# Patient Record
Sex: FEMALE | Race: WHITE | NOT HISPANIC OR LATINO | Employment: OTHER | URBAN - METROPOLITAN AREA
[De-identification: names, ages, dates, MRNs, and addresses within clinical notes are randomized per-mention and may not be internally consistent; named-entity substitution may affect disease eponyms.]

---

## 2017-03-18 ENCOUNTER — HOSPITAL ENCOUNTER (OUTPATIENT)
Dept: RADIOLOGY | Facility: HOSPITAL | Age: 82
Discharge: HOME | End: 2017-03-18
Payer: MEDICARE

## 2017-03-18 DIAGNOSIS — Z12.31 ENCOUNTER FOR SCREENING MAMMOGRAM FOR MALIGNANT NEOPLASM OF BREAST: ICD-10-CM

## 2017-03-18 PROCEDURE — 77063 BREAST TOMOSYNTHESIS BI: CPT | Mod: 26,,, | Performed by: RADIOLOGY

## 2017-03-18 PROCEDURE — 77067 SCR MAMMO BI INCL CAD: CPT | Mod: 26,,, | Performed by: RADIOLOGY

## 2017-03-18 PROCEDURE — 77067 SCR MAMMO BI INCL CAD: CPT | Mod: TC

## 2018-06-25 NOTE — PROGRESS NOTES
"Name: Agnieszka Hodgson  MRN: [id number]   CSN: [id number]      Date: 18      Referring physician:  Aaareferral Self  No address on file    Subjective:      Chief Complaint:  Memory    History of Present Illness (HPI):    Agnieszka Hodgson is a 86 y.o.  female who presents today in the [facility] for evaluation of memory. She is accompanied her daughter (Heber) and  (Edwardo). Heber made the appt and made it very clear that the terms Alzheimer's and dementia should not be used in front of her as she will become extremely anxious. Mrs. Hodgson's daughter-in-law  of Alzheimer's disease. She told her granddaughter she just cannot have this.     For the past 18 months, they have noticed that she has problems with word find. She has a long hx of anxiety. Everything was exacerbated when she moved to [place] from [place] on  to be closer to their son. In addition, she has Macular Degeneration and can't see (much). The move has made this more difficult as well as she is not familiar with her surrounding and has trouble finding things since her vision is impaired.  They know that part of her confusion is due to her vision. However, when dtr tells her something is on the right or left, she does not always seem to understand.     She has more trouble with word find at the end of the day. She does pretty well in the mornings.     They feel the anxeity and fear also exacerbates her issues. She has been on clonazepam for 12+ years. She currently takes 0.5 mg - 1/2 tab BID. If she is even an hour late, she will feel shaky and nauseous. In years past, a doctor tried to lower the dose in hopes of getting her off this med. She was given mirtazapine also in hopes of transitioning to this instead. She was not able to do this. Dtr says she will likely "freak out" just hearing she should wean off this.     Since the move, she hardly does anything for herself. He will tell her exactly what to do to " make coffee- again emphasizing her sight playing at least some part in this.   She is able to feed and dress self.  She can bathe herself but needs help with getting out of tub.   She is able to walk okay. Has ankle problems.   Fallen a couple of times which has increased her fear.      She states she has been derpessed as a result of macular degeneration. She feels it is more anxierty than deprssion. She expressses fear of falling. She does not appreciate memory loss.         Review of Systems   Constitutional: Negative for appetite change.   HENT: Negative for drooling and trouble swallowing.    Respiratory: Negative for cough, choking and shortness of breath.    Cardiovascular: Positive for leg swelling.   Gastrointestinal: Positive for constipation and diarrhea.   Genitourinary: Positive for frequency and urgency.   Musculoskeletal: Positive for gait problem.   Neurological: Negative for tremors.   Psychiatric/Behavioral: Positive for dysphoric mood. Negative for hallucinations and sleep disturbance. The patient is nervous/anxious.        Past Medical History: The patient  has a past medical history of Fall from chair; Gastric disease; Hypertension; and Vasovagal syndrome.    Social History: The patient  reports that she has never smoked. She does not have any smokeless tobacco history on file.    Family History: Their family history includes Heart disease in her mother; Liver cancer in her father.    Allergies: Codeine; Morphine; and Reglan [metoclopramide hcl]     Meds:   Current Outpatient Prescriptions on File Prior to Visit   Medication Sig Dispense Refill    aspirin 325 MG tablet Take 325 mg by mouth twice daily three times a week.      bifidobacterium infantis (ALIGN) 4 mg Cap Take by mouth once daily.      CALCIUM PHOSPHATE TRIB/VIT D3 (CITRACAL + D ORAL) Take by mouth.      clonazepam (KLONOPIN) 0.5 MG tablet Take 0.5 mg by mouth 2 (two) times daily as needed.      cyanocobalamin, vitamin B-12,  "(VITAMIN B-12) 1,000 mcg Subl Place under the tongue once daily.      lisinopril (PRINIVIL,ZESTRIL) 5 MG tablet Take 5 mg by mouth once daily. 2.5 in am      mirtazapine (REMERON) 7.5 MG Tab Take 7.5 mg by mouth every evening.      multivitamin capsule Take 1 capsule by mouth once daily.      POLYETHYLENE GLYCOL 3350 (MIRALAX ORAL) Take by mouth.      raloxifene (EVISTA) 60 mg tablet Take 60 mg by mouth once daily.      SIMETHICONE ORAL Take by mouth.      VIT A/VIT C/VIT E/ZINC/COPPER (PRESERVISION AREDS ORAL) Take by mouth 2 (two) times daily.      vitamin D 1000 units Tab Take 185 mg by mouth once daily.       No current facility-administered medications on file prior to visit.        Objective:     Physical Exam:    Vitals:    06/27/18 1421 06/27/18 1423   BP: (!) 164/73 (!) 148/70   Pulse: 73 69   Height: 5' 4" (1.626 m)      There is no height or weight on file to calculate BMI.    Constitutional  Well-developed, well-nourished, appears stated age   Cardiovascular  Radial pulses 2+ and symmetric, no LE edema bilaterally     ..  Neurological    * Mental status     - Orientation Oriented to: person, place and situation     - Memory     Not Tested- Dr. Mcfarland tested     - Attention/concentration  Easily distracted but this seems to be the case with the  as well- they are both very detailed driven and not flexible with thought processes and transitioning (will keep going back to same topic finding it hard to move on)     - Language  Fluent, do not appreciate any word searching or aphasia today       * Cranial nerves       - CN II  PER, diminished visual fields     - CN III, IV, VI  Extraocular movements full     - CN V  Sensation V1 - V3 intact     - CN VII  Face strong and symmetric bilaterally     - CN VIII  Hearing intact bilaterally     - CN IX, X  Palate raises midline and symmetric     - CN XI  SCM and trapezius 5/5 bilaterally     - CN XII  Tongue midline   * Motor  Muscle bulk normal, " strength 5/5 throughout, except 4+5/5 B iliopsoas   * Sensory   Intact to LT throughout   * Coordination  No dysmetria with finger-to-nose   * Gait  Ambulates with walker with skis today. Steady.              Laboratory Results:  Lab Visit on 06/27/2018   Component Date Value Ref Range Status    WBC 06/27/2018 7.57  3.90 - 12.70 K/uL Final    RBC 06/27/2018 4.00  4.00 - 5.40 M/uL Final    Hemoglobin 06/27/2018 11.8* 12.0 - 16.0 g/dL Final    Hematocrit 06/27/2018 38.6  37.0 - 48.5 % Final    MCV 06/27/2018 97  82 - 98 fL Final    MCH 06/27/2018 29.5  27.0 - 31.0 pg Final    MCHC 06/27/2018 30.6* 32.0 - 36.0 g/dL Final    RDW 06/27/2018 14.0  11.5 - 14.5 % Final    Platelets 06/27/2018 227  150 - 350 K/uL Final    MPV 06/27/2018 11.5  9.2 - 12.9 fL Final    Immature Granulocytes 06/27/2018 0.4  0.0 - 0.5 % Final    Gran # (ANC) 06/27/2018 4.3  1.8 - 7.7 K/uL Final    Immature Grans (Abs) 06/27/2018 0.03  0.00 - 0.04 K/uL Final    Lymph # 06/27/2018 2.0  1.0 - 4.8 K/uL Final    Mono # 06/27/2018 0.8  0.3 - 1.0 K/uL Final    Eos # 06/27/2018 0.3  0.0 - 0.5 K/uL Final    Baso # 06/27/2018 0.06  0.00 - 0.20 K/uL Final    nRBC 06/27/2018 0  0 /100 WBC Final    Gran% 06/27/2018 57.3  38.0 - 73.0 % Final    Lymph% 06/27/2018 26.4  18.0 - 48.0 % Final    Mono% 06/27/2018 10.6  4.0 - 15.0 % Final    Eosinophil% 06/27/2018 4.5  0.0 - 8.0 % Final    Basophil% 06/27/2018 0.8  0.0 - 1.9 % Final    Differential Method 06/27/2018 Automated   Final    Sodium 06/27/2018 140  136 - 145 mmol/L Final    Potassium 06/27/2018 3.9  3.5 - 5.1 mmol/L Final    Chloride 06/27/2018 102  95 - 110 mmol/L Final    CO2 06/27/2018 30* 23 - 29 mmol/L Final    Glucose 06/27/2018 89  70 - 110 mg/dL Final    BUN, Bld 06/27/2018 21  8 - 23 mg/dL Final    Creatinine 06/27/2018 0.8  0.5 - 1.4 mg/dL Final    Calcium 06/27/2018 10.2  8.7 - 10.5 mg/dL Final    Total Protein 06/27/2018 6.8  6.0 - 8.4 g/dL Final    Albumin  06/27/2018 3.7  3.5 - 5.2 g/dL Final    Total Bilirubin 06/27/2018 0.3  0.1 - 1.0 mg/dL Final    Alkaline Phosphatase 06/27/2018 57  55 - 135 U/L Final    AST 06/27/2018 25  10 - 40 U/L Final    ALT 06/27/2018 18  10 - 44 U/L Final    Anion Gap 06/27/2018 8  8 - 16 mmol/L Final    eGFR if African American 06/27/2018 >60.0  >60 mL/min/1.73 m^2 Final    eGFR if non African American 06/27/2018 >60.0  >60 mL/min/1.73 m^2 Final    Vitamin B-12 06/27/2018 >2000* 210 - 950 pg/mL Final    Free T4 06/27/2018 0.98  0.71 - 1.51 ng/dL Final    TSH 06/27/2018 1.239  0.400 - 4.000 uIU/mL Final       Imaging:   Independent review of images: NA    Assessment and Plan     Memory loss  -     CBC auto differential; Future; Expected date: 06/27/2018  -     Comprehensive metabolic panel; Future; Expected date: 06/27/2018  -     Vitamin B12; Future; Expected date: 06/27/2018  -     Folate; Future; Expected date: 06/27/2018  -     T4, free; Future; Expected date: 06/27/2018  -     TSH; Future; Expected date: 06/27/2018  -     RPR; Future; Expected date: 06/27/2018  -     CT Head Without Contrast; Future; Expected date: 06/27/2018        Medical Decision Making:    Speech is fluent today. I do not appreciate word finding difficulties or aphasia. She clearly has anxiety as demonstrated when talking about CT head, labs, and recommendation to wean clonazepam.   She is agreeable to labs.   She is undecided if she wants to proceed with CT head. I have entered the order. If decides to do, dtr will call Becka to assist in scheduling.   I have given instructions about weaning clonazepam in detail and discussed impact on memory with this class of drugs. Dtr asks if they could wait a few weeks before attempting to give her more time to settle into new environment. I think it is fine to wait a few weeks before wean.   Could always consider increasing mirtazapine or giving SSRI.   Follow up memory clinic 6 months.     I spent 75 minutes  face-to-face with the patient with >50% of the time spent with counseling and education regarding:  - results of data, diagnosis, and recommendations stated above  -rationale of above plan  - importance of diet and exercise    Katiana Scott DNP, NP-C  Division of Movement and Memory Disorders  [facility]  [phone]

## 2018-06-27 ENCOUNTER — OFFICE VISIT (OUTPATIENT)
Dept: NEUROLOGY | Facility: CLINIC | Age: 83
End: 2018-06-27
Payer: MEDICARE

## 2018-06-27 ENCOUNTER — LAB VISIT (OUTPATIENT)
Dept: LAB | Facility: HOSPITAL | Age: 83
End: 2018-06-27
Payer: MEDICARE

## 2018-06-27 VITALS — HEIGHT: 64 IN | HEART RATE: 69 BPM | SYSTOLIC BLOOD PRESSURE: 148 MMHG | DIASTOLIC BLOOD PRESSURE: 70 MMHG

## 2018-06-27 DIAGNOSIS — R41.3 MEMORY LOSS: Primary | ICD-10-CM

## 2018-06-27 DIAGNOSIS — R41.3 MEMORY LOSS: ICD-10-CM

## 2018-06-27 LAB
ALBUMIN SERPL BCP-MCNC: 3.7 G/DL
ALP SERPL-CCNC: 57 U/L
ALT SERPL W/O P-5'-P-CCNC: 18 U/L
ANION GAP SERPL CALC-SCNC: 8 MMOL/L
AST SERPL-CCNC: 25 U/L
BASOPHILS # BLD AUTO: 0.06 K/UL
BASOPHILS NFR BLD: 0.8 %
BILIRUB SERPL-MCNC: 0.3 MG/DL
BUN SERPL-MCNC: 21 MG/DL
CALCIUM SERPL-MCNC: 10.2 MG/DL
CHLORIDE SERPL-SCNC: 102 MMOL/L
CO2 SERPL-SCNC: 30 MMOL/L
CREAT SERPL-MCNC: 0.8 MG/DL
DIFFERENTIAL METHOD: ABNORMAL
EOSINOPHIL # BLD AUTO: 0.3 K/UL
EOSINOPHIL NFR BLD: 4.5 %
ERYTHROCYTE [DISTWIDTH] IN BLOOD BY AUTOMATED COUNT: 14 %
EST. GFR  (AFRICAN AMERICAN): >60 ML/MIN/1.73 M^2
EST. GFR  (NON AFRICAN AMERICAN): >60 ML/MIN/1.73 M^2
FOLATE SERPL-MCNC: 32.8 NG/ML
GLUCOSE SERPL-MCNC: 89 MG/DL
HCT VFR BLD AUTO: 38.6 %
HGB BLD-MCNC: 11.8 G/DL
IMM GRANULOCYTES # BLD AUTO: 0.03 K/UL
IMM GRANULOCYTES NFR BLD AUTO: 0.4 %
LYMPHOCYTES # BLD AUTO: 2 K/UL
LYMPHOCYTES NFR BLD: 26.4 %
MCH RBC QN AUTO: 29.5 PG
MCHC RBC AUTO-ENTMCNC: 30.6 G/DL
MCV RBC AUTO: 97 FL
MONOCYTES # BLD AUTO: 0.8 K/UL
MONOCYTES NFR BLD: 10.6 %
NEUTROPHILS # BLD AUTO: 4.3 K/UL
NEUTROPHILS NFR BLD: 57.3 %
NRBC BLD-RTO: 0 /100 WBC
PLATELET # BLD AUTO: 227 K/UL
PMV BLD AUTO: 11.5 FL
POTASSIUM SERPL-SCNC: 3.9 MMOL/L
PROT SERPL-MCNC: 6.8 G/DL
RBC # BLD AUTO: 4 M/UL
SODIUM SERPL-SCNC: 140 MMOL/L
T4 FREE SERPL-MCNC: 0.98 NG/DL
TSH SERPL DL<=0.005 MIU/L-ACNC: 1.24 UIU/ML
VIT B12 SERPL-MCNC: >2000 PG/ML
WBC # BLD AUTO: 7.57 K/UL

## 2018-06-27 PROCEDURE — 36415 COLL VENOUS BLD VENIPUNCTURE: CPT

## 2018-06-27 PROCEDURE — 99999 PR PBB SHADOW E&M-EST. PATIENT-LVL III: CPT | Mod: PBBFAC,,,

## 2018-06-27 PROCEDURE — 96118 PR NEUROPSYCH TESTING BY PSYCH/PHYS: CPT | Mod: PBBFAC | Performed by: PSYCHIATRY & NEUROLOGY

## 2018-06-27 PROCEDURE — 85025 COMPLETE CBC W/AUTO DIFF WBC: CPT

## 2018-06-27 PROCEDURE — 80053 COMPREHEN METABOLIC PANEL: CPT

## 2018-06-27 PROCEDURE — 82607 VITAMIN B-12: CPT

## 2018-06-27 PROCEDURE — 82746 ASSAY OF FOLIC ACID SERUM: CPT

## 2018-06-27 PROCEDURE — 84439 ASSAY OF FREE THYROXINE: CPT

## 2018-06-27 PROCEDURE — 99213 OFFICE O/P EST LOW 20 MIN: CPT | Mod: PBBFAC,25

## 2018-06-27 PROCEDURE — 96118 PR NEUROPSYCH TESTING BY PSYCH/PHYS: CPT | Mod: S$PBB,,, | Performed by: PSYCHIATRY & NEUROLOGY

## 2018-06-27 PROCEDURE — 86592 SYPHILIS TEST NON-TREP QUAL: CPT

## 2018-06-27 PROCEDURE — 99205 OFFICE O/P NEW HI 60 MIN: CPT | Mod: S$PBB,,, | Performed by: PSYCHIATRY & NEUROLOGY

## 2018-06-27 PROCEDURE — 84443 ASSAY THYROID STIM HORMONE: CPT

## 2018-06-27 NOTE — PROGRESS NOTES
NEUROBEHAVIORAL STATUS EXAMINATION - CONFIDENTIAL  MEMORY DISORDERS CLINIC    MEDICAL NECESSITY: Evaluate cognitive and neuropsychiatric symptoms in the setting of known cognitive dysfunction.  30355 = 1 unit     HISTORY OF PRESENT ILLNESS: Ms. Agnieszka Hodgson is an 86-year-old  female with 12 years of education (valedictorian) who was referred to the Memory Disorders Clinic in the setting of family reported cognitive decline over the past 18 months. Her family primarily reported word finding difficulties. Ms. Hodgson endorsed changes in memory following a move about 3 months ago. She suffers from macular degeneration and attributes her memory difficulties to vision loss in addition to learning a new environment. Please see Dr. Garrido note for a more detailed overview of her interval history.      IADLS/DAILY FUNCTIONING: Ms. Hodgson and her  moved into a new residence about 3 months ago and live independently. Their daughter lives in [place] and they see her once per week.   Medication Compliance: She reported strong medication compliance with support of her , who cuts her pills and orders refills. She was able to freely recall several of her medications by name. She has been taking clonazepam twice per day for over the past 10 years.    Appointment Management: Jointly with family members.    Driving: She stopped driving about 6 years ago.      CURRENT MEDICATIONS:  aspirin 325 MG tablet     bifidobacterium infantis (ALIGN) 4 mg Cap    CALCIUM PHOSPHATE TRIB/VIT D3 (CITRACAL + D ORAL)    clonazepam (KLONOPIN) 0.5 MG tablet    cyanocobalamin, vitamin B-12, (VITAMIN B-12) 1,000 mcg Subl    lisinopril (PRINIVIL,ZESTRIL) 5 MG tablet    mirtazapine (REMERON) 7.5 MG Tab    multivitamin capsule    POLYETHYLENE GLYCOL 3350 (MIRALAX ORAL)    raloxifene (EVISTA) 60 mg tablet    SIMETHICONE ORAL    VIT A/VIT C/VIT E/ZINC/COPPER (PRESERVISION AREDS ORAL)    vitamin D 1000 units Tab     BEHAVIORAL  OBSERVATIONS/TEST RESULTS: Ms. Hodgson was oriented to most aspects of time. She correctly stated the month, exact date, day of the week, and current president. She initially stated the year was 1918 before correctly stating 2018 when asked a second time. She was fully oriented to location. Ms. Pruitt drawing of a clock face was below expectation. She sam multiple lines extending from the middle of the clock. Her drawing could only fit numbers 1-10. She seemed to correctly place the clock hands at the designated time. She was circumlocutious and tangential during the clinical interview.     Ms. Pruitt encoding of a supraspan word list was extremely low as she recalled 2, 3, 4, and 4 of 10 words across the learning trials. She freely recalled 1/10 words following a long delay. Recognition was extremely low as she correctly identified 7/10 words with 3 false positive errors. Ms. Pruitt encoding of a short story was extremely low. She could not recall any story details following a long delay. Incidental visual memory of a previously copied figure was extremely low. Confrontation naming was average. Semantic verbal fluency was borderline.     The examiner met with Ms. Hodgson about 45 minutes after neuropsychological testing. She could not provide any details about the testing at that time.     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 Likely chronic and acute effects from long-term benzodiazepine use. Possible early stages of a neurodegenerative condition as well.    Recommendations:   1. Level of Care - Appropriately managed. Continue to provide support in all complex activities of daily living (medications, finances, appointments, cooking)  2. Reduce benzodiazepine use  3. Follow-up - Memory Disorders Clinic in 6 months.     Link Mcfarland Psy.D., ABPP  Board Certified in Clinical Neuropsychology  Department of  Neurology    APPENDIX  TESTS ADMINISTERED:  Clinical Interview and Review of Records, Repeatable Battery for the Assessment of Neuropsychological Status (RBANS, Form A).     RBANS   Index  Immediate Memory  61        Language   89        Delayed Memory  52       Subtests   Scaled Scores  List learning   3  Story Memory   3  Figure Copy   1  Naming   26-50%  Fluency   5  Digit Span   5  List Recall   10-16%  List Recognition  <2%  Story Recall   3  Figure Recall   3

## 2018-06-27 NOTE — PATIENT INSTRUCTIONS
If you decide that you want to try weaning off clonazepam please do as follows...    Week 1 & 2- take 1/2 tablet in morning and 1/4 tablet at bedtime    Wee 3&4- take 1/4 tablet morning and bedtime    Week 5&6- take 1/4 tablet in morning only    Week 7- stop clonazepam    Continue your other medications without change.

## 2018-06-28 LAB — RPR SER QL: NORMAL

## 2018-07-02 ENCOUNTER — TELEPHONE (OUTPATIENT)
Dept: NEUROLOGY | Facility: CLINIC | Age: 83
End: 2018-07-02

## 2018-07-06 ENCOUNTER — TELEPHONE (OUTPATIENT)
Dept: NEUROLOGY | Facility: CLINIC | Age: 83
End: 2018-07-06

## 2018-07-06 NOTE — TELEPHONE ENCOUNTER
Spoke to Pt daughter she verbalized her mother CT appt date, time and location.       ---- Message from Sana Ramirez sent at 7/6/2018  1:14 PM CDT -----  Contact: Heber (daughter) @ [phone] cell or  [phone]  Calling to speak with someone in Dr. Scott's office to schedule a CT for the patient. Please call.

## 2018-07-10 ENCOUNTER — HOSPITAL ENCOUNTER (OUTPATIENT)
Dept: RADIOLOGY | Facility: HOSPITAL | Age: 83
Discharge: HOME | End: 2018-07-10
Attending: NURSE PRACTITIONER
Payer: MEDICARE

## 2018-07-10 DIAGNOSIS — R41.3 MEMORY LOSS: ICD-10-CM

## 2018-07-10 PROCEDURE — 70450 CT HEAD/BRAIN W/O DYE: CPT | Mod: 26,,, | Performed by: RADIOLOGY

## 2018-07-10 PROCEDURE — 70450 CT HEAD/BRAIN W/O DYE: CPT | Mod: TC

## 2018-07-19 ENCOUNTER — TELEPHONE (OUTPATIENT)
Dept: NEUROLOGY | Facility: CLINIC | Age: 83
End: 2018-07-19

## 2018-07-20 ENCOUNTER — TELEPHONE (OUTPATIENT)
Dept: NEUROLOGY | Facility: CLINIC | Age: 83
End: 2018-07-20

## 2018-07-20 DIAGNOSIS — E23.6 ENLARGED PITUITARY GLAND: Primary | ICD-10-CM

## 2018-07-20 NOTE — TELEPHONE ENCOUNTER
Spoke to Mr. Hodgson about CT head.   Rec endocrine referral and visual field test.   Agreeable.   Sees Dr. Archie Nascimento, ophthalmology, for Macular Degeneration.   Called Dr. Nascimento to see if okay to do visual field test. He agrees and can do in his office. They will arrange appt.   Informed Mr. Hodgson to expect call from Dr. Nascimento office.

## 2018-07-23 ENCOUNTER — TELEPHONE (OUTPATIENT)
Dept: NEUROLOGY | Facility: CLINIC | Age: 83
End: 2018-07-23

## 2018-07-23 NOTE — TELEPHONE ENCOUNTER
----- Message from Sana Ramirez sent at 7/23/2018 10:13 AM CDT -----  Contact: Heber (daughter) @ [phone]  Asking to speak with someone in Dr. Scott's office regarding getting the patient set up with Endo on the [facility], does not want to setup before talking to someone in the doctor's office. Please call.

## 2018-07-23 NOTE — TELEPHONE ENCOUNTER
Call returned/Spoke with Heber-    She called to advise our office that she would rather her mom see a Endocrinologist on the [facility] closer to home. She then asked if the referral was entered in the computer, I replied yes and she advised me that she would call to schedule the appt herself.

## 2018-08-10 ENCOUNTER — LAB VISIT (OUTPATIENT)
Dept: LAB | Facility: HOSPITAL | Age: 83
End: 2018-08-10
Attending: INTERNAL MEDICINE
Payer: MEDICARE

## 2018-08-10 DIAGNOSIS — E27.8 MASS OF ADRENAL GLAND: Primary | ICD-10-CM

## 2018-08-10 LAB
ALBUMIN SERPL BCP-MCNC: 3.5 G/DL
ALP SERPL-CCNC: 57 U/L
ALT SERPL W/O P-5'-P-CCNC: 15 U/L
ANION GAP SERPL CALC-SCNC: 7 MMOL/L
AST SERPL-CCNC: 23 U/L
BASOPHILS # BLD AUTO: 0.04 K/UL
BASOPHILS NFR BLD: 0.7 %
BILIRUB SERPL-MCNC: 0.5 MG/DL
BUN SERPL-MCNC: 16 MG/DL
CALCIUM SERPL-MCNC: 9.6 MG/DL
CHLORIDE SERPL-SCNC: 103 MMOL/L
CO2 SERPL-SCNC: 29 MMOL/L
CORTIS SERPL-MCNC: 13.4 UG/DL
CREAT SERPL-MCNC: 0.8 MG/DL
DIFFERENTIAL METHOD: ABNORMAL
EOSINOPHIL # BLD AUTO: 0.3 K/UL
EOSINOPHIL NFR BLD: 5 %
ERYTHROCYTE [DISTWIDTH] IN BLOOD BY AUTOMATED COUNT: 13.6 %
EST. GFR  (AFRICAN AMERICAN): >60 ML/MIN/1.73 M^2
EST. GFR  (NON AFRICAN AMERICAN): >60 ML/MIN/1.73 M^2
ESTRADIOL SERPL-MCNC: <10 PG/ML
GLUCOSE SERPL-MCNC: 106 MG/DL
HCT VFR BLD AUTO: 37.1 %
HGB BLD-MCNC: 11.8 G/DL
IMM GRANULOCYTES # BLD AUTO: 0.02 K/UL
IMM GRANULOCYTES NFR BLD AUTO: 0.3 %
LYMPHOCYTES # BLD AUTO: 2 K/UL
LYMPHOCYTES NFR BLD: 32.8 %
MCH RBC QN AUTO: 30.2 PG
MCHC RBC AUTO-ENTMCNC: 31.8 G/DL
MCV RBC AUTO: 95 FL
MONOCYTES # BLD AUTO: 0.5 K/UL
MONOCYTES NFR BLD: 8.8 %
NEUTROPHILS # BLD AUTO: 3.2 K/UL
NEUTROPHILS NFR BLD: 52.4 %
NRBC BLD-RTO: 0 /100 WBC
PLATELET # BLD AUTO: 222 K/UL
PMV BLD AUTO: 11.5 FL
POTASSIUM SERPL-SCNC: 4 MMOL/L
PROLACTIN SERPL IA-MCNC: 10.6 NG/ML
PROT SERPL-MCNC: 6.3 G/DL
RBC # BLD AUTO: 3.91 M/UL
SODIUM SERPL-SCNC: 139 MMOL/L
T4 FREE SERPL-MCNC: 0.92 NG/DL
TSH SERPL DL<=0.005 MIU/L-ACNC: 0.91 UIU/ML
WBC # BLD AUTO: 6.01 K/UL

## 2018-08-10 PROCEDURE — 82533 TOTAL CORTISOL: CPT

## 2018-08-10 PROCEDURE — 85025 COMPLETE CBC W/AUTO DIFF WBC: CPT

## 2018-08-10 PROCEDURE — 84443 ASSAY THYROID STIM HORMONE: CPT

## 2018-08-10 PROCEDURE — 84305 ASSAY OF SOMATOMEDIN: CPT

## 2018-08-10 PROCEDURE — 82024 ASSAY OF ACTH: CPT

## 2018-08-10 PROCEDURE — 82670 ASSAY OF TOTAL ESTRADIOL: CPT

## 2018-08-10 PROCEDURE — 84439 ASSAY OF FREE THYROXINE: CPT

## 2018-08-10 PROCEDURE — 84146 ASSAY OF PROLACTIN: CPT

## 2018-08-10 PROCEDURE — 36415 COLL VENOUS BLD VENIPUNCTURE: CPT | Mod: PO

## 2018-08-10 PROCEDURE — 80053 COMPREHEN METABOLIC PANEL: CPT

## 2018-08-14 LAB
ACTH PLAS-MCNC: 49 PG/ML
IGF-I SERPL-MCNC: 85 NG/ML (ref 33–175)
IGF-I Z-SCORE SERPL: 0 SD

## 2018-09-05 ENCOUNTER — TELEPHONE (OUTPATIENT)
Dept: NEUROLOGY | Facility: CLINIC | Age: 83
End: 2018-09-05

## 2018-09-05 NOTE — TELEPHONE ENCOUNTER
Called. No answer and no identified VM. Stated that I was returning call from Heber, identified myself and time/date.       To note:   I referred her to endocrine. I do not have her results and am not sure what they sent to her.

## 2018-09-05 NOTE — TELEPHONE ENCOUNTER
----- Message from Juan Ramon Regalado sent at 9/5/2018 12:35 PM CDT -----  Needs Advice    Reason for call:  Heber is asking to speak w/ Katiana about test results that were emailed back to Katiana from the pt's ENDO.    Communication Preference: Heber (mom) @ [phone]  Additional Information:

## 2018-09-05 NOTE — TELEPHONE ENCOUNTER
Returned call to patient daughter Heber, who states she would like a call back from Katiana regarding the results from Endo, and what the next course for treatment would be for patient. Please advise. Heber can be reached at [phone].

## 2018-09-06 ENCOUNTER — TELEPHONE (OUTPATIENT)
Dept: NEUROLOGY | Facility: CLINIC | Age: 83
End: 2018-09-06

## 2018-09-06 NOTE — TELEPHONE ENCOUNTER
Spoke to jame Buck, at length.   States endocrine did not see anything problematic.   Had visual field test- some visual issues- will watch.     Wants to know plan now in regards to memory.   Explained that memory plan has not changed as we discussed [id number]- rec weaning clonazepam.   Has not done this yet - her mom did not feel she could do this while undergoing testing for pituitary.   Heber is now 6 weeks away from getting  and does not feel she can handle being over there everynight while trying to wean.  Talked to PCP about wean- did not like the idea- felt she has been on this for so long, no need to change now per dtr.     Again, our recommendations have not changed.

## 2018-10-11 ENCOUNTER — TELEPHONE (OUTPATIENT)
Dept: NEUROLOGY | Facility: CLINIC | Age: 83
End: 2018-10-11

## 2018-10-11 NOTE — TELEPHONE ENCOUNTER
----- Message from Juan Ramon Regalado sent at 10/11/2018  8:47 AM CDT -----  Needs Advice    Reason for call: Heber is asking to speak w/ Katiana about the pt's GP discontinuing Prozac, due to the medication not working        Communication Preference: Heber (daughter) @ [phone] or     Additional Information:

## 2018-10-11 NOTE — TELEPHONE ENCOUNTER
Returned call to daughter and she states the PCP taken her mom off medication.She has now been placed on prozac for the last three weeks. The patient since the change in medication has felt more shaky, lethargic. She also complains of headaches and stomach pain. Daughter is not sure why this change was made with this medication and told to stop previous medication as she had been on medication for a very long time. She would like for Katiana to give her a call.

## 2018-10-12 NOTE — TELEPHONE ENCOUNTER
Called both numbers. No answer and no identifiable voice message.     Recommend that daughter call PCP about questions she has about why a med was discontinued and to inform PCP about her current reaction to the new medicine.   Since I did not switch the medicine, I cannot speak to why it was changed.

## 2018-10-17 ENCOUNTER — TELEPHONE (OUTPATIENT)
Dept: NEUROLOGY | Facility: CLINIC | Age: 83
End: 2018-10-17

## 2018-10-17 NOTE — TELEPHONE ENCOUNTER
----- Message from Juan Ramon Regalado sent at 10/17/2018  2:28 PM CDT -----  Needs Advice    Reason for call: Heber is asking to speak w/ Katiana about the pt's GP diagnosing pt with dementia. Heber states the GP is putting the pt on Aricept and Namenda.        Communication Preference: Heber (daughter) @ [phone] or     Additional Information:

## 2018-10-17 NOTE — TELEPHONE ENCOUNTER
Called ms Buck back; stated pt went to pcp who took off  meloxicam and on prozac; stated that pt came off of prozac about two weeks ago, but unsure due to malina being very disheveled and also stating the geovanni has been told by pcp that has dementia, very anxious and is now taking donepezil and memantine. Malina would like a call back from Katiana please advise

## 2018-10-17 NOTE — TELEPHONE ENCOUNTER
I have again talked at length with her daughter about dx, medications, what PCP recommended.    Several weeks ago stopped mirtazapine and started prozac.   Had 3 weeks of stomach issues. Has had stomach issues for years.  PCP told her to stop prozac. Dtr concerned as she did not think this was to be stopped abruptly.     PCP gave her memory test in office yesterday- scored 14/30 and diagnosed her with dementia- started her on Aricept and Namenda.   I do not recommend that she start both at the same time. I again would recommend she wean clonazepam before she starts a memory med so we have a better idea of what is going on.   She has still not weaned any of the clonazepam.     Discussed mild vs major neurocognitive disorders.   Discussed term dementia and progression as well as tx.    Discussed her long term use of benzos and poss impact on memory.

## 2019-02-12 DIAGNOSIS — D35.2 BENIGN NEOPLASM OF PITUITARY GLAND AND CRANIOPHARYNGEAL DUCT (POUCH): Primary | ICD-10-CM

## 2019-02-12 DIAGNOSIS — D35.3 BENIGN NEOPLASM OF PITUITARY GLAND AND CRANIOPHARYNGEAL DUCT (POUCH): Primary | ICD-10-CM

## 2020-01-14 ENCOUNTER — APPOINTMENT (OUTPATIENT)
Dept: LAB | Facility: HOSPITAL | Age: 85
End: 2020-01-14
Attending: FAMILY MEDICINE
Payer: MEDICARE

## 2020-01-14 DIAGNOSIS — N39.0 URINARY TRACT INFECTION, SITE NOT SPECIFIED: Primary | ICD-10-CM

## 2020-01-14 LAB
BACTERIA #/AREA URNS HPF: ABNORMAL /HPF
BILIRUB UR QL STRIP: NEGATIVE
CLARITY UR: ABNORMAL
COLOR UR: YELLOW
GLUCOSE UR QL STRIP: NEGATIVE
HGB UR QL STRIP: NEGATIVE
HYALINE CASTS #/AREA URNS LPF: 6 /LPF
KETONES UR QL STRIP: NEGATIVE
LEUKOCYTE ESTERASE UR QL STRIP: ABNORMAL
MICROSCOPIC COMMENT: ABNORMAL
NITRITE UR QL STRIP: NEGATIVE
PH UR STRIP: 6 [PH] (ref 5–8)
PROT UR QL STRIP: NEGATIVE
RBC #/AREA URNS HPF: 1 /HPF (ref 0–4)
SP GR UR STRIP: 1.01 (ref 1–1.03)
URN SPEC COLLECT METH UR: ABNORMAL
UROBILINOGEN UR STRIP-ACNC: NEGATIVE EU/DL
WBC #/AREA URNS HPF: >100 /HPF (ref 0–5)
WBC CLUMPS URNS QL MICRO: ABNORMAL

## 2020-01-14 PROCEDURE — 81000 URINALYSIS NONAUTO W/SCOPE: CPT

## 2020-01-14 PROCEDURE — 87077 CULTURE AEROBIC IDENTIFY: CPT

## 2020-01-14 PROCEDURE — 87086 URINE CULTURE/COLONY COUNT: CPT

## 2020-01-14 PROCEDURE — 87186 SC STD MICRODIL/AGAR DIL: CPT

## 2020-01-14 PROCEDURE — 87088 URINE BACTERIA CULTURE: CPT

## 2020-01-16 LAB — BACTERIA UR CULT: ABNORMAL

## 2021-04-15 ENCOUNTER — PATIENT MESSAGE (OUTPATIENT)
Dept: RESEARCH | Facility: HOSPITAL | Age: 86
End: 2021-04-15